# Patient Record
Sex: FEMALE | Race: WHITE | Employment: FULL TIME | URBAN - METROPOLITAN AREA
[De-identification: names, ages, dates, MRNs, and addresses within clinical notes are randomized per-mention and may not be internally consistent; named-entity substitution may affect disease eponyms.]

---

## 2017-08-21 LAB
ABO + RH BLD: NORMAL
ABO + RH BLD: NORMAL
HBV SURFACE AG SERPL QL IA: NORMAL
HIV 1+2 AB+HIV1 P24 AG SERPL QL IA: NORMAL
RUBELLA ANTIBODY IGG QUANTITATIVE: NORMAL IU/ML

## 2018-01-19 ENCOUNTER — OFFICE VISIT (OUTPATIENT)
Dept: FAMILY MEDICINE | Facility: CLINIC | Age: 33
End: 2018-01-19
Payer: COMMERCIAL

## 2018-01-19 VITALS
HEART RATE: 104 BPM | OXYGEN SATURATION: 100 % | DIASTOLIC BLOOD PRESSURE: 57 MMHG | BODY MASS INDEX: 25.95 KG/M2 | TEMPERATURE: 97.9 F | SYSTOLIC BLOOD PRESSURE: 100 MMHG | HEIGHT: 64 IN | WEIGHT: 152 LBS

## 2018-01-19 DIAGNOSIS — B97.89 VIRAL SINUSITIS: Primary | ICD-10-CM

## 2018-01-19 DIAGNOSIS — J32.9 VIRAL SINUSITIS: Primary | ICD-10-CM

## 2018-01-19 DIAGNOSIS — Z3A.29 29 WEEKS GESTATION OF PREGNANCY: ICD-10-CM

## 2018-01-19 PROCEDURE — 99213 OFFICE O/P EST LOW 20 MIN: CPT | Performed by: PREVENTIVE MEDICINE

## 2018-01-19 NOTE — PROGRESS NOTES
SUBJECTIVE:   Zee Mensah is a 32 year old female who presents to clinic today for the following health issues:      RESPIRATORY SYMPTOMS      Duration: X 2 WEEKS    Description  nasal congestion, rhinorrhea, sore throat, facial pain/pressure, cough, fever, chills, ear pain bilateral, headache, fatigue/malaise, hoarse voice and myalgias    Severity: severe    Accompanying signs and symptoms: None    History (predisposing factors):  none    Precipitating or alleviating factors: 29 weeks pregnant    Therapies tried and outcome:  acetaminophen    Daughter with ear infection  Increased fatigue since yesterday  Nasal congestion+  Post nasal drainage+  No rash  No diarrhea  Chills+   Daughter with pneumonia       Problem list and histories reviewed & adjusted, as indicated.  Additional history: as documented    Patient Active Problem List   Diagnosis     CARDIOVASCULAR SCREENING; LDL GOAL LESS THAN 160     Labor and delivery indication for care or intervention      (spontaneous vaginal delivery)      delivery     Past Surgical History:   Procedure Laterality Date     HC TOOTH EXTRACTION W/FORCEP         Social History   Substance Use Topics     Smoking status: Never Smoker     Smokeless tobacco: Never Used     Alcohol use Yes     Family History   Problem Relation Age of Onset     CEREBROVASCULAR DISEASE Maternal Grandfather      Breast Cancer Maternal Grandmother      dx age <40     HEART DISEASE Maternal Grandfather      MI         No current outpatient prescriptions on file.     No Known Allergies  BP Readings from Last 3 Encounters:   18 100/57   16 95/62   07/08/15 97/57    Wt Readings from Last 3 Encounters:   18 152 lb (68.9 kg)   16 129 lb 3.2 oz (58.6 kg)   07/06/15 153 lb (69.4 kg)                  Labs reviewed in EPIC        [facility]  [facility]       Reviewed and updated as needed this visit by Provider      "    ROS:  Constitutional, HEENT, cardiovascular, pulmonary, gi and gu systems are negative, except as otherwise noted.      OBJECTIVE:                                                    /57  Pulse 104  Temp 97.9  F (36.6  C) (Oral)  Ht 5' 4\" (1.626 m)  Wt 152 lb (68.9 kg)  SpO2 100%  Breastfeeding? No  BMI 26.09 kg/m2  Body mass index is 26.09 kg/(m^2).  GENERAL APPEARANCE: healthy, alert and no distress  EYES: Eyes grossly normal to inspection and conjunctivae and sclerae normal  HENT: ear canals and TM's normal, nose and mouth without ulcers or lesions and No pharyngeal exudates or pus points, no uvular deviation, no tenderness over the sinuses   NECK: no adenopathy and trachea midline and normal to palpation  RESP: lungs clear to auscultation - no rales, rhonchi or wheezes  CV: regular rates and rhythm, normal S1 S2, no S3 or S4 and no murmur, click or rub  ABDOMEN: Gravid uterus+  SKIN: no suspicious lesions or rashes  NEURO: Normal strength and tone, mentation intact and speech normal  PSYCH: mentation appears normal and affect normal/bright    Diagnostic test results:  Diagnostic Test Results:  No results found for this or any previous visit (from the past 24 hour(s)).       ASSESSMENT/PLAN:                                                    1. Viral sinusitis  Your symptoms and exam today indicate that you have a viral upper respiratory illness.  This includes viral rhinosinusitis and viral bronchitis.  Antibiotics do not help viral illnesses; the best remedies treat the symptoms (see below).  The typical course of a viral illness is that you feel miserable for the first few days - with sore throat, runny nose/nasal congestion, cough, and sometimes fever and body aches.  You should start to feel better after about 5-7 days and much better by 10-14 days.  If you develop sudden worsening of symptoms or fever after the first 5-7 days, or if you have persistence of your symptoms beyond 14 days, let " us know as you may have developed a secondary bacterial infection.  Get plenty of rest, especially while you have a fever. Stop smoking and avoid secondhand smoke. Drink lots of fluids such as water and clear soups. Fluids help loosen mucus. Fluids are also important because they help prevent dehydration. Gargle with warm salt water a few times a day to relieve a sore throat. Throat sprays or lozenges may also help relieve the pain.Avoid alcohol. Use saline (salt water) nose drops to help loosen mucus and moisten the tender skin in your nose.    -List of medication safe for use in pregnancy discussed, this includes saline sinus rinse, Claritin, Zyrtec, Benadryl, Flonase nasal spray       2. 29 weeks gestation of pregnancy  -Per PCP       Follow up with Provider - If not better in 1 week or fever over 101 F     Criselda Parker MD MPH    [facility]

## 2018-01-19 NOTE — MR AVS SNAPSHOT
After Visit Summary   1/19/2018    Zee Mensah    MRN: [id number]           Patient Information     Date Of Birth          1985        Visit Information        Provider Department      1/19/2018 10:20 AM Criselda Parker MD [facility]        Today's Diagnoses     Viral sinusitis    -  1      Care Instructions    At [facility], we strive to deliver an exceptional experience to you, every time we see you.  If you receive a survey in the mail, please send us back your thoughts. We really do value your feedback.    Based on your medical history, these are the current health maintenance/preventive care services that you are due for (some may have been done at this visit.)  Health Maintenance Due   Topic Date Due     INFLUENZA VACCINE (SYSTEM ASSIGNED)  09/01/2017         Suggested websites for health information:  Www.[employer] : Up to date and easily searchable information on multiple topics.  Www.[place].gov : medication info, interactive tutorials, watch real surgeries online  Www.familydoctor.org : good info from the Academy of Family Physicians  Www.cdc.gov : public health info, travel advisories, epidemics (H1N1)  Www.aap.org : children's health info, normal development, vaccinations  Www.health.state[place].us : [place] dept of health, public health issues in [place], N1N1    Your care team:                            Family Medicine Internal Medicine   MD Wild Saucedo MD Shantel Branch-Fleming, MD Katya Georgiev PA-C Nam Ho, MD Pediatrics   LILIANA Payne, MD Lelo Gillespie CNP, MD Deborah Mielke, MD Kim Thein, APRNEIDA CNP      Clinic hours: Monday - Thursday 7 am-7 pm; Fridays 7 am-5 pm.   Urgent care: Monday - Friday 11 am-9 pm; Saturday and Sunday 9 am-5 pm.  Pharmacy : Monday -Thursday 8 am-8 pm; Friday 8 am-6 pm; Saturday and Sunday 9 am-5 pm.     Clinic: [phone]   Pharmacy: [phone]      Self-Care for Sinusitis     Drinking plenty of water can help sinuses drain.   Sinusitis can often be managed with self-care. Self-care can keep sinuses moist and make you feel more comfortable. Remember to follow your doctor's instructions closely. This can make a big difference in getting your sinus problem under control.  Drink fluids  Drinking extra fluids helps thin your mucus. This lets it drain from your sinuses more easily. Have a glass of water every hour or two. A humidifier helps in much the same way. Fluids can also offset the drying effects of certain medicines. If you use a humidifier, follow the product maker's instructions on how to use it. Clean it on a regular schedule.  Use saltwater rinses  Rinses help keep your sinuses and nose moist. Mix a teaspoon of salt in 8 ounces of fresh, warm water. Use a bulb syringe to gently squirt the water into your nose a few times a day. You can also buy ready-made saline nasal sprays.  Apply hot or cold packs  Applying heat to the area surrounding your sinuses may make you feel more comfortable. Use a hot water bottle or a hand towel dipped in hot water. Some people also find ice packs effective for relieving pain.  Medicines  Your doctor may prescribe medications to help treat your sinusitis. If you have an infection, antibiotics can help clear it up. If you are prescribed antibiotics, take all pills on schedule until they are gone, even if you feel better. Decongestants help relieve swelling. Use decongestant sprays for short periods only under the direction of your doctor. If you have allergies, your doctor may prescribe medications to help relieve them.   Date Last Reviewed: 10/1/2016    1931-1504 [employer]. [address] [place] [address], [place] [address]. All rights reserved. This information is not intended as a substitute for professional medical care. Always follow your healthcare professional's  "instructions.                Follow-ups after your visit        Who to contact     If you have questions or need follow up information about today's clinic visit or your schedule please contact [facility] directly at [phone].  Normal or non-critical lab and imaging results will be communicated to you by MyChart, letter or phone within 4 business days after the clinic has received the results. If you do not hear from us within 7 days, please contact the clinic through MyChart or phone. If you have a critical or abnormal lab result, we will notify you by phone as soon as possible.  Submit refill requests through [employer] or call your pharmacy and they will forward the refill request to us. Please allow 3 business days for your refill to be completed.          Additional Information About Your Visit        [employer] Information     [employer] lets you send messages to your doctor, view your test results, renew your prescriptions, schedule appointments and more. To sign up, go to [email or web address] . Click on \"Log in\" on the left side of the screen, which will take you to the Welcome page. Then click on \"Sign up Now\" on the right side of the page.     You will be asked to enter the access code listed below, as well as some personal information. Please follow the directions to create your username and password.     Your access code is: [email or web address]  Expires: 2018 10:55 AM     Your access code will  in 90 days. If you need help or a new code, please call your [place] clinic or [phone].        Care EveryWhere ID     This is your Care EveryWhere ID. This could be used by other organizations to access your [place] medical records  [id number]        Your Vitals Were     Pulse Temperature Height Pulse Oximetry Breastfeeding? BMI (Body Mass Index)    104 97.9  F (36.6  C) (Oral) 5' 4\" (1.626 m) 100% No 26.09 kg/m2       Blood Pressure from Last 3 Encounters:   18 100/57 "   02/18/16 95/62   07/08/15 97/57    Weight from Last 3 Encounters:   01/19/18 152 lb (68.9 kg)   02/18/16 129 lb 3.2 oz (58.6 kg)   07/06/15 153 lb (69.4 kg)              Today, you had the following     No orders found for display         Today's Medication Changes          These changes are accurate as of: 1/19/18 10:55 AM.  If you have any questions, ask your nurse or doctor.               Stop taking these medicines if you haven't already. Please contact your care team if you have questions.     azithromycin 250 MG tablet   Commonly known as:  ZITHROMAX   Stopped by:  Criselda Parker MD           ibuprofen 400 MG tablet   Commonly known as:  ADVIL/MOTRIN   Stopped by:  Criselda Parker MD                    Primary Care Provider Office Phone # Fax #    Maria E Neyda Sánchez [phone]       [facility] [address] [facility] [address]        Equal Access to Services     [facility]: Hadii aad ku hadasho Soomaali, waaxda luqadaha, qaybta kaalmada adeegyada, waxay idiin hayaan carmen patel . So [facility] [phone].    ATENCIÓN: Si habla español, tiene a weeks disposición servicios gratuitos de asistencia lingüística. Llame al [phone].    We comply with applicable federal civil rights laws and [place] laws. We do not discriminate on the basis of race, color, national origin, age, disability, sex, sexual orientation, or gender identity.            Thank you!     Thank you for choosing [facility]  for your care. Our goal is always to provide you with excellent care. Hearing back from our patients is one way we can continue to improve our services. Please take a few minutes to complete the written survey that you may receive in the mail after your visit with us. Thank you!             Your Updated Medication List - Protect others around you: Learn how to safely use, store and throw away your medicines at www.disposemymeds.org.      Notice  As of 1/19/2018  10:55 AM    You have not been prescribed any medications.

## 2018-01-19 NOTE — PATIENT INSTRUCTIONS
At [facility], we strive to deliver an exceptional experience to you, every time we see you.  If you receive a survey in the mail, please send us back your thoughts. We really do value your feedback.    Based on your medical history, these are the current health maintenance/preventive care services that you are due for (some may have been done at this visit.)  Health Maintenance Due   Topic Date Due     INFLUENZA VACCINE (SYSTEM ASSIGNED)  09/01/2017         Suggested websites for health information:  Www.[employer] : Up to date and easily searchable information on multiple topics.  Www.[employer].gov : medication info, interactive tutorials, watch real surgeries online  Www.familydoctor.org : good info from the Academy of Family Physicians  Www.cdc.gov : public health info, travel advisories, epidemics (H1N1)  Www.aap.org : children's health info, normal development, vaccinations  Www.health.[facility].[place].us : [place] dept of health, public health issues in [place], N1N1    Your care team:                            Family Medicine Internal Medicine   MD Wild Saucedo MD Shantel Branch-Fleming, MD Katya Georgiev PA-C Nam Ho, MD Pediatrics   Pasha Morel PAFARNAZ Moreno, CNP Lesley Valle APRN CNP   MD Lelo Trivedi MD Deborah Mielke, MD Kim Thein, APRN CNP      Clinic hours: Monday - Thursday 7 am-7 pm; Fridays 7 am-5 pm.   Urgent care: Monday - Friday 11 am-9 pm; Saturday and Sunday 9 am-5 pm.  Pharmacy : Monday -Thursday 8 am-8 pm; Friday 8 am-6 pm; Saturday and Sunday 9 am-5 pm.     Clinic: [phone]   Pharmacy: [phone]      Self-Care for Sinusitis     Drinking plenty of water can help sinuses drain.   Sinusitis can often be managed with self-care. Self-care can keep sinuses moist and make you feel more comfortable. Remember to follow your doctor's instructions closely. This can make a big difference in getting your sinus problem under  control.  Drink fluids  Drinking extra fluids helps thin your mucus. This lets it drain from your sinuses more easily. Have a glass of water every hour or two. A humidifier helps in much the same way. Fluids can also offset the drying effects of certain medicines. If you use a humidifier, follow the product maker's instructions on how to use it. Clean it on a regular schedule.  Use saltwater rinses  Rinses help keep your sinuses and nose moist. Mix a teaspoon of salt in 8 ounces of fresh, warm water. Use a bulb syringe to gently squirt the water into your nose a few times a day. You can also buy ready-made saline nasal sprays.  Apply hot or cold packs  Applying heat to the area surrounding your sinuses may make you feel more comfortable. Use a hot water bottle or a hand towel dipped in hot water. Some people also find ice packs effective for relieving pain.  Medicines  Your doctor may prescribe medications to help treat your sinusitis. If you have an infection, antibiotics can help clear it up. If you are prescribed antibiotics, take all pills on schedule until they are gone, even if you feel better. Decongestants help relieve swelling. Use decongestant sprays for short periods only under the direction of your doctor. If you have allergies, your doctor may prescribe medications to help relieve them.   Date Last Reviewed: 10/1/2016    9553-1466 [employer]. [address] [place] [address], [place] [address]. All rights reserved. This information is not intended as a substitute for professional medical care. Always follow your healthcare professional's instructions.

## 2018-01-20 ENCOUNTER — NURSE TRIAGE (OUTPATIENT)
Dept: NURSING | Facility: CLINIC | Age: 33
End: 2018-01-20

## 2018-01-20 NOTE — TELEPHONE ENCOUNTER
"Seen in clinic yesterday, diagnosed with viral sinusitis.  Symptoms began with nasal congestion approx 2 weeks ago, starting Monday 1/15/18 started having pain in sinuses and teeth, sore throat from drainage, fever and green nasal drainage.  Right side of face is \"very tender\" to the touch.  Taking Tylenol and did take Benadryl which slightly helped, able to sleep x 4 hours last night.      Reason for Disposition    [1] Sinus pain (not just congestion) AND [2] fever    Protocols used: SINUS PAIN OR CONGESTION-ADULT-    "

## 2018-03-09 LAB — GROUP B STREP PCR: NORMAL

## 2018-03-26 ENCOUNTER — HOSPITAL ENCOUNTER (INPATIENT)
Facility: CLINIC | Age: 33
LOS: 1 days | Discharge: HOME | End: 2018-03-27
Attending: OBSTETRICS & GYNECOLOGY | Admitting: ADVANCED PRACTICE MIDWIFE
Payer: COMMERCIAL

## 2018-03-26 LAB
ABO + RH BLD: NORMAL
ABO + RH BLD: NORMAL
BLOOD BANK CMNT PATIENT-IMP: NORMAL
SPECIMEN EXP DATE BLD: NORMAL
T PALLIDUM IGG+IGM SER QL: NEGATIVE

## 2018-03-26 PROCEDURE — 86900 BLOOD TYPING SEROLOGIC ABO: CPT | Performed by: OBSTETRICS & GYNECOLOGY

## 2018-03-26 PROCEDURE — 10907ZC DRAINAGE OF AMNIOTIC FLUID, THERAPEUTIC FROM PRODUCTS OF CONCEPTION, VIA NATURAL OR ARTIFICIAL OPENING: ICD-10-PCS | Performed by: ADVANCED PRACTICE MIDWIFE

## 2018-03-26 PROCEDURE — 72200001 ZZH LABOR CARE VAGINAL DELIVERY SINGLE

## 2018-03-26 PROCEDURE — 36415 COLL VENOUS BLD VENIPUNCTURE: CPT | Performed by: OBSTETRICS & GYNECOLOGY

## 2018-03-26 PROCEDURE — 25000132 ZZH RX MED GY IP 250 OP 250 PS 637: Performed by: ADVANCED PRACTICE MIDWIFE

## 2018-03-26 PROCEDURE — 12000037 ZZH R&B POSTPARTUM INTERMEDIATE

## 2018-03-26 PROCEDURE — 59025 FETAL NON-STRESS TEST: CPT

## 2018-03-26 PROCEDURE — 86780 TREPONEMA PALLIDUM: CPT | Performed by: OBSTETRICS & GYNECOLOGY

## 2018-03-26 PROCEDURE — G0463 HOSPITAL OUTPT CLINIC VISIT: HCPCS | Mod: 25

## 2018-03-26 PROCEDURE — 86901 BLOOD TYPING SEROLOGIC RH(D): CPT | Performed by: OBSTETRICS & GYNECOLOGY

## 2018-03-26 PROCEDURE — 25000128 H RX IP 250 OP 636: Performed by: OBSTETRICS & GYNECOLOGY

## 2018-03-26 RX ORDER — ACETAMINOPHEN 325 MG/1
650 TABLET ORAL EVERY 4 HOURS PRN
Status: DISCONTINUED | OUTPATIENT
Start: 2018-03-26 | End: 2018-03-26

## 2018-03-26 RX ORDER — MISOPROSTOL 200 UG/1
400 TABLET ORAL
Status: DISCONTINUED | OUTPATIENT
Start: 2018-03-26 | End: 2018-03-28 | Stop reason: HOSPADM

## 2018-03-26 RX ORDER — AMOXICILLIN 250 MG
1 CAPSULE ORAL 2 TIMES DAILY PRN
Status: DISCONTINUED | OUTPATIENT
Start: 2018-03-26 | End: 2018-03-28 | Stop reason: HOSPADM

## 2018-03-26 RX ORDER — IBUPROFEN 400 MG/1
800 TABLET, FILM COATED ORAL
Status: DISCONTINUED | OUTPATIENT
Start: 2018-03-26 | End: 2018-03-26

## 2018-03-26 RX ORDER — OXYTOCIN 10 [USP'U]/ML
10 INJECTION, SOLUTION INTRAMUSCULAR; INTRAVENOUS
Status: COMPLETED | OUTPATIENT
Start: 2018-03-26 | End: 2018-03-26

## 2018-03-26 RX ORDER — OXYTOCIN/0.9 % SODIUM CHLORIDE 30/500 ML
340 PLASTIC BAG, INJECTION (ML) INTRAVENOUS CONTINUOUS PRN
Status: DISCONTINUED | OUTPATIENT
Start: 2018-03-26 | End: 2018-03-28 | Stop reason: HOSPADM

## 2018-03-26 RX ORDER — PRENATAL VIT/IRON FUM/FOLIC AC 27MG-0.8MG
1 TABLET ORAL DAILY
COMMUNITY

## 2018-03-26 RX ORDER — AMOXICILLIN 250 MG
2 CAPSULE ORAL 2 TIMES DAILY PRN
Status: DISCONTINUED | OUTPATIENT
Start: 2018-03-26 | End: 2018-03-28 | Stop reason: HOSPADM

## 2018-03-26 RX ORDER — ONDANSETRON 2 MG/ML
4 INJECTION INTRAMUSCULAR; INTRAVENOUS EVERY 6 HOURS PRN
Status: DISCONTINUED | OUTPATIENT
Start: 2018-03-26 | End: 2018-03-26

## 2018-03-26 RX ORDER — NALOXONE HYDROCHLORIDE 0.4 MG/ML
.1-.4 INJECTION, SOLUTION INTRAMUSCULAR; INTRAVENOUS; SUBCUTANEOUS
Status: DISCONTINUED | OUTPATIENT
Start: 2018-03-26 | End: 2018-03-26

## 2018-03-26 RX ORDER — METHYLERGONOVINE MALEATE 0.2 MG/ML
200 INJECTION INTRAVENOUS
Status: DISCONTINUED | OUTPATIENT
Start: 2018-03-26 | End: 2018-03-26

## 2018-03-26 RX ORDER — FENTANYL CITRATE 50 UG/ML
50-100 INJECTION, SOLUTION INTRAMUSCULAR; INTRAVENOUS
Status: DISCONTINUED | OUTPATIENT
Start: 2018-03-26 | End: 2018-03-26

## 2018-03-26 RX ORDER — OXYCODONE AND ACETAMINOPHEN 5; 325 MG/1; MG/1
1 TABLET ORAL
Status: DISCONTINUED | OUTPATIENT
Start: 2018-03-26 | End: 2018-03-26

## 2018-03-26 RX ORDER — SODIUM CHLORIDE, SODIUM LACTATE, POTASSIUM CHLORIDE, CALCIUM CHLORIDE 600; 310; 30; 20 MG/100ML; MG/100ML; MG/100ML; MG/100ML
INJECTION, SOLUTION INTRAVENOUS CONTINUOUS
Status: DISCONTINUED | OUTPATIENT
Start: 2018-03-26 | End: 2018-03-26

## 2018-03-26 RX ORDER — HYDROCORTISONE 2.5 %
CREAM (GRAM) TOPICAL 3 TIMES DAILY PRN
Status: DISCONTINUED | OUTPATIENT
Start: 2018-03-26 | End: 2018-03-28 | Stop reason: HOSPADM

## 2018-03-26 RX ORDER — OXYTOCIN/0.9 % SODIUM CHLORIDE 30/500 ML
100 PLASTIC BAG, INJECTION (ML) INTRAVENOUS CONTINUOUS
Status: DISCONTINUED | OUTPATIENT
Start: 2018-03-26 | End: 2018-03-28 | Stop reason: HOSPADM

## 2018-03-26 RX ORDER — BISACODYL 10 MG
10 SUPPOSITORY, RECTAL RECTAL DAILY PRN
Status: DISCONTINUED | OUTPATIENT
Start: 2018-03-28 | End: 2018-03-28 | Stop reason: HOSPADM

## 2018-03-26 RX ORDER — IBUPROFEN 400 MG/1
800 TABLET, FILM COATED ORAL EVERY 6 HOURS PRN
Status: DISCONTINUED | OUTPATIENT
Start: 2018-03-26 | End: 2018-03-28 | Stop reason: HOSPADM

## 2018-03-26 RX ORDER — OXYTOCIN/0.9 % SODIUM CHLORIDE 30/500 ML
100-340 PLASTIC BAG, INJECTION (ML) INTRAVENOUS CONTINUOUS PRN
Status: DISCONTINUED | OUTPATIENT
Start: 2018-03-26 | End: 2018-03-26

## 2018-03-26 RX ORDER — CARBOPROST TROMETHAMINE 250 UG/ML
250 INJECTION, SOLUTION INTRAMUSCULAR
Status: DISCONTINUED | OUTPATIENT
Start: 2018-03-26 | End: 2018-03-26

## 2018-03-26 RX ORDER — LANOLIN 100 %
OINTMENT (GRAM) TOPICAL
Status: DISCONTINUED | OUTPATIENT
Start: 2018-03-26 | End: 2018-03-28 | Stop reason: HOSPADM

## 2018-03-26 RX ORDER — OXYTOCIN 10 [USP'U]/ML
10 INJECTION, SOLUTION INTRAMUSCULAR; INTRAVENOUS
Status: DISCONTINUED | OUTPATIENT
Start: 2018-03-26 | End: 2018-03-28 | Stop reason: HOSPADM

## 2018-03-26 RX ORDER — ACETAMINOPHEN 325 MG/1
650 TABLET ORAL EVERY 4 HOURS PRN
Status: DISCONTINUED | OUTPATIENT
Start: 2018-03-26 | End: 2018-03-28 | Stop reason: HOSPADM

## 2018-03-26 RX ADMIN — IBUPROFEN 800 MG: 400 TABLET ORAL at 17:47

## 2018-03-26 RX ADMIN — ACETAMINOPHEN 650 MG: 325 TABLET, FILM COATED ORAL at 23:41

## 2018-03-26 RX ADMIN — OXYTOCIN 10 UNITS: 10 INJECTION INTRAVENOUS at 13:29

## 2018-03-26 RX ADMIN — IBUPROFEN 800 MG: 400 TABLET ORAL at 23:41

## 2018-03-26 NOTE — PLAN OF CARE
"Ashley Carol CNM called with update. Updated regarding, but not limited to, latest SVE, patient stating she is more uncomfortable with contractions. Patient stated \"when I started to feel this way with my last baby, I had her 15 minutes later\". Patient declined pain interventions at this time. Plan for Ashley to work her way towards the hospital. Will call her if needed sooner.   "

## 2018-03-26 NOTE — PLAN OF CARE
Problem: Patient Care Overview  Goal: Plan of Care/Patient Progress Review  Outcome: Improving  Pt arrived to [facility] around 1600.  Oriented to room, call light, and safety education.  Pt has been able to void in large amounts and bleeding is scant.  Ambulating without difficulty and tolerating regular diet and PO pain meds.  Taking ibuprofen for cramping while breastfeeding.  Independent with feedings and baby cares.  Pt requests to have bath done later this evening as there is a lot of family and visitors present.  Encouraged pt to call with questions or needs. Pt states she would like to DC home 3/27 if possible.  Cont to monitor.

## 2018-03-26 NOTE — IP AVS SNAPSHOT
[address]    [place] [address]    Phone:  [phone]                                       After Visit Summary   3/26/2018    Zee Mensah    MRN: [id number]           After Visit Summary Signature Page     I have received my discharge instructions, and my questions have been answered. I have discussed any challenges I see with this plan with the nurse or doctor.    ..........................................................................................................................................  Patient/Patient Representative Signature      ..........................................................................................................................................  Patient Representative Print Name and Relationship to Patient    ..................................................               ................................................  Date                                            Time    ..........................................................................................................................................  Reviewed by Signature/Title    ...................................................              ..............................................  Date                                                            Time

## 2018-03-26 NOTE — PLAN OF CARE
Took over care from No MCMANUS RN at 1310. Ashley APPIAH CNM in room. Pt with urge to push. Josefa SIDDIQI CNM called to attend delivery. Infant starting to crown @ 1320. Another OB provider brought in to room for assistance. Approximately <1 minute shoulder dystocia. Delivery of viable baby boy at 1322. Infant has bruised head and face. No FHT tracing- had reassuring intermittent auscultation in labor.

## 2018-03-26 NOTE — IP AVS SNAPSHOT
MRN:[id number]                      After Visit Summary   3/26/2018    Zee Mensah    MRN: [id number]           Thank you!     Thank you for choosing [facility] for your care. Our goal is always to provide you with excellent care. Hearing back from our patients is one way we can continue to improve our services. Please take a few minutes to complete the written survey that you may receive in the mail after you visit with us. Thank you!        Patient Information     Date Of Birth          1985        About your hospital stay     You were admitted on:  March 26, 2018 You last received care in the:  [address]    You were discharged on:  March 27, 2018        Reason for your hospital stay       Maternity care                  Who to Call     For medical emergencies, please call 911.  For non-urgent questions about your medical care, please call your primary care provider or clinic, [phone]          Attending Provider     Provider Specialty    Fátima Beavers MD OB/Gyn    Josefa Andrade APRN CNM Midwives       Primary Care Provider Office Phone # Fax #    Maria E Sánchez [phone]      After Care Instructions     Activity       Review discharge instructions            Diet       Resume previous diet            Discharge Instructions - Postpartum visit       Schedule postpartum visit with your provider and return to clinic in 6 weeks.                  Follow-up Appointments     Follow Up and recommended labs and tests                 Further instructions from your care team       Postpartum Vaginal Delivery Instructions    Activity       Ask family and friends for help when you need it.    Do not place anything in your vagina for 6 weeks.    You are not restricted on other activities, but take it easy for a few weeks to allow your body to recover from delivery.  You are able to do any activities you feel  up to that point.    No driving until you have stopped taking your pain medications (usually two weeks after delivery).     Call your health care provider if you have any of these symptoms:       Increased pain, swelling, redness, or fluid around your stiches from an episiotomy or perineal tear.    A fever above 100.4 F (38 C) with or without chills when placing a thermometer under your tongue.    You soak a sanitary pad with blood within 1 hour, or you see blood clots larger than a golf ball.    Bleeding that lasts more than 6 weeks.    Vaginal discharge that smells bad.    Severe pain, cramping or tenderness in your lower belly area.    A need to urinate more frequently (use the toilet more often), more urgently (use the toilet very quickly), or it burns when you urinate.    Nausea and vomiting.    Redness, swelling or pain around a vein in your leg.    Problems breastfeeding or a red or painful area on your breast.    Chest pain and cough or are gasping for air.    Problems coping with sadness, anxiety, or depression.  If you have any concerns about hurting yourself or the baby, call your provider immediately.     You have questions or concerns after you return home.     Keep your hands clean:  Always wash your hands before touching your perineal area and stitches.  This helps reduce your risk of infection.  If your hands aren't dirty, you may use an alcohol hand-rub to clean your hands. Keep your nails clean and short.        Pending Results     Date and Time Order Name Status Description    3/27/2018 0600 Rh Immune Globulin Study In process             Statement of Approval     Ordered          03/27/18 0858  I have reviewed and agree with all the recommendations and orders detailed in this document.  EFFECTIVE NOW     Approved and electronically signed by:  Rach Rawls CNM             Admission Information     Date & Time Provider Department Dept. Phone    3/26/2018 Josefa Andrade APRN CNM  "[address] [phone]      Your Vitals Were     Blood Pressure Pulse Temperature Respirations Last Period       109/61 (BP Location: Right arm) 82 97.8  F (36.6  C) (Oral) 16 2017       [employer] Information     [facility] lets you send messages to your doctor, view your test results, renew your prescriptions, schedule appointments and more. To sign up, go to [email or web address] . Click on \"Log in\" on the left side of the screen, which will take you to the Welcome page. Then click on \"Sign up Now\" on the right side of the page.     You will be asked to enter the access code listed below, as well as some personal information. Please follow the directions to create your username and password.     Your access code is: [email or web address]  Expires: 2018 11:55 AM     Your access code will  in 90 days. If you need help or a new code, please call your [facility] clinic or [phone].        Care EveryWhere ID     This is your Care EveryWhere ID. This could be used by other organizations to access your [facility] medical records  [id number]        Equal Access to Services     ROXANNE GONZALEZ AH: Eder Moreno, wadiego manning, qaybta kaalmada adejose, anabela schwartz. So [facility] [phone].    ATENCIÓN: Si habla español, tiene a weeks disposición servicios gratuitos de asistencia lingüística. Llame al [phone].    We comply with applicable federal civil rights laws and [place] laws. We do not discriminate on the basis of race, color, national origin, age, disability, sex, sexual orientation, or gender identity.               Review of your medicines      CONTINUE these medicines which have NOT CHANGED        Dose / Directions    prenatal multivitamin plus iron 27-0.8 MG Tabs per tablet        Dose:  1 tablet   Take 1 tablet by mouth daily   Refills:  0                Protect others around you: Learn how to safely use, store and throw away your " medicines at www.disposemymeds.org.             Medication List: This is a list of all your medications and when to take them. Check marks below indicate your daily home schedule. Keep this list as a reference.      Medications           Morning Afternoon Evening Bedtime As Needed    prenatal multivitamin plus iron 27-0.8 MG Tabs per tablet   Take 1 tablet by mouth daily

## 2018-03-26 NOTE — PROGRESS NOTES
[facility] Labor and Delivery History and Physical    Zee Cardenas MRN# [id number]   Age: 32 year old YOB: 1985     Date of Admission:  3/26/2018  Time of Admission: Approximately 05:00am    No changes in patient status or health history since last clinic visit. Please see comprehensive history in patient's prenatal record.         Chief Complaint:   Zee Cardenas is a 32 year old female who is 39w1d pregnant and being admitted for spontaneous onset of contractions. She reports mild ctx, that feel like tightening, started at approximately 01:40am on 3/26/18. Positive fetal movement. Denies vaginal bleeding, leaking of fluid, severe headache, RUQ pain, or visual changes. She has history of unplanned delivery at home with second child due to rapid delivery, and history of 36w5d with third child. Presented to triage for evaluation at approximately 03:30am.    Rh negative, received Rhogam at 28w5d  GBS negative  Rubella immune          Pregnancy history:     OBSTETRIC HISTORY:    Obstetric History       T1      L3     SAB0   TAB0   Ectopic0   Multiple0   Live Births3       # Outcome Date GA Lbr Master/2nd Weight Sex Delivery Anes PTL Lv   4 Current            3  07/06/15 36w5d 07:04 / 00:02 3.056 kg (6 lb 11.8 oz) F Vag-Spont None  UNIQUE      Apgar1:  8                Apgar5: 8   2 Para 12   3.118 kg (6 lb 14 oz) F Vag-Spont None N UNIQUE      Name: VANESSA CARDENAS Term 06/03/10   3.657 kg (8 lb 1 oz) M  EPI N UNIQUE          EDC: Estimated Date of Delivery: 2018    Prenatal Labs:   Lab Results   Component Value Date    ABO O 2018    RH Neg 2018    HEPBANG neg 2017    TREPAB non reactive 2011    RUBELLAABIGG immune 2011    HGB 13.6 2016       GBS Status:   Lab Results   Component Value Date    GBS neg 2018       Active Problem List  Patient Active Problem List   Diagnosis     CARDIOVASCULAR SCREENING; LDL  GOAL LESS THAN 160     Labor and delivery indication for care or intervention      (spontaneous vaginal delivery)      delivery     Indication for care in labor or delivery     Normal labor and delivery       Medication Prior to Admission  Prescriptions Prior to Admission   Medication Sig Dispense Refill Last Dose     Prenatal Vit-Fe Fumarate-FA (PRENATAL MULTIVITAMIN PLUS IRON) 27-0.8 MG TABS per tablet Take 1 tablet by mouth daily   3/25/2018 at Unknown time   .        Maternal Past Medical History:   History reviewed. No pertinent past medical history.                       Social History:   I have reviewed this patient's social history          Physical Exam:   Vitals were reviewed  Blood pressure 104/56, pulse 70, temperature 98  F (36.7  C), temperature source Oral, resp. rate 18, last menstrual period 2017, not currently breastfeeding.  Constitutional:   awake, alert, cooperative, no apparent distress, and appears stated age     Abdomen: EFW 7.5#, fetal back on maternal right - by Leopold's     Cervix: Exam performed by RN at approx 03:30am   Membranes: intact   Dilation: 3   Effacement: 80%   Station:-2   Consistency: soft   Position: Anterior  Presentation:Cephalic  Fetal Heart Rate Tracing: reactive and reassuring  Tocometer: external monitor, ctx every 3-6 mins    Additional              Assessment:   Zee Mensah is a 39w1d pregnant female admitted in spontaneous labor.  Rh Neg  Hx of Precipitous labor & delivery          Plan:   Admit - see IP orders  Anticipate     Ashley Medina, ISABELA SPARROW

## 2018-03-26 NOTE — PROGRESS NOTES
"OB Progress Note  3/26/2018  9:52 AM    Patient seen at 8:15am - Note from this time    S:  Pt describes contractions as \"tightening,\" no change from this morning, not painful. Same sensation as she had prior to last precipitous delivery at home. Reported her last labor went quickly after water broke.       O:  /55  Temp 98  F (36.7  C) (Oral)  Resp 18  LMP 2017  EFM: baseline 125bpm, accelerations present, no decelerations, moderate variability; Category 1  [place]:  Ctx q3-6 min  SVE:  4-5/80%/-1  Membranes: AROM at 08:20am for clear fluid    A:  32 year old  @39w1d admitted with spontaneous onset of contractions  History of unplanned delivery at home, patient did not perceive strong contractions  FHR: Category 1  Early Labor and progressing  ROM x 15 mins, GBS negative    P:  1.  Routine cares  2.  Intermittent Auscultation  3. Patient declines saline lock at this time. Discussed risk of delayed care in the case of emergency and the need to place IV if desires epidural or pitocin is needed.  4. Plan unmedicated labor, patient open to other options if needed.  5. Since patient has continued to make cervical change, recommended patient stay at hospital vs.patient going home d/t  ctx not intensifying. Patient agrees to plan. Offered AROM at this time to augment labor, reviewed risks, patient agrees to plan. AROM for clear fluid.    Ashley Medina CNM    "

## 2018-03-26 NOTE — L&D DELIVERY NOTE
Delivery Summary    Zee Mensah MRN# [id number]   Age: 32 year old YOB: 1985       OB Delivery Summary    Zee Mensah is a 31 yo, now , who presented to L&D on 3/26/18, at 39w1d with c/o spontaneous onset of contractions. Her history is significant for: Rh negative status, history of prior unplanned delivery of second child at home following rapid delivery, and  delivery of third baby in hospital at 36w5d.. She was 3/80%/-2 at 03:30am in triage and 3.5cm/80%/-1 at 04:30. Decision was made to admit patient. She felt mild tightening during contractions in morning, and was 4-5cm/80%/-1 at 08:30am. AROM for clear fluid at that time. She progressed to complete dilation at 13:20, during which time she began to feel cramping with contractions. During this time, FHR was Category 1 by EFM then reassuring by intermittent auscultation. Patient did not receive epidural or other medication for pain management.    Patient began feeling painful contractions around 13:00, and first felt rectal pressure at approximately 13:20. I lifted sheet and fetal head was visible. RN was not present in room at that time. Called RN into room and asked for hospital doctor to be present since I am orienting to hospital and [facility] ObGy physician in OR performing  on other patient. Dr. Alana Christina entered room to provide backup if needed. Patient in semi-spivey's position. Fetal head delivered at 13:22pm, OA and restituting to MAGGIE. Anterior shoulder (left) did not deliver with gentle traction downwards during maternal expulsive efforts. Patient placed in Feliz position and shoulder did not deliver with continued maternal effort. Patient instructed to stop pushing and Dr. Christina applied suprapubic pressure. Anterior shoulder delivered with body quickly following. Nuchal cord noted. Male infant, 8#7.8oz. Initial cry but not initially vigorous so cord clamped and cut. Infant quickly began  "crying and was kept on maternal abdomen, Apgars 8/9. Cord blood collected.    Spontaneous delivery of placenta approximately 5 minutes after infant delivery, intact with 3VC. Inspection revealed intact perineum. Fundus firm. Total EBL 150mL. Mother and baby doing well.      Ashley Medina  3/26/2018  2:05 PM         Labor Event Times    Labor onset date:  3/26/18 Onset time:   1:40 AM            Labor Events     labor?:  No    steroids:  None   Labor Type:  Spontaneous, AROM      Antibiotics received during labor?:  No      Rupture identifier:  Rupture 1   Rupture date/time: 3/26/18 0831   Rupture type:  Artificial Rupture of Membranes   Fluid color:  Clear      Delivery/Placenta Date and Time    Delivery Date:  3/26/18 Delivery Time:   1:22 PM      Apgars    Living status:  Living    1 Minute 5 Minute 10 Minute 15 Minute 20 Minute   Skin color: 0  1       Heart rate: 2  2       Reflex irritability: 2  2       Muscle tone: 2  2       Respiratory effort: 2  2       Total: 8  9          Apgars assigned by:  MAURY PATRICIA RN      Cord    Vessels:  3 Vessels Complications:  Nuchal   Cord Blood Disposition:  Lab Gases Sent?:  No         Rampart Resuscitation       Rampart Care at Delivery:  Bulb suction.       Rampart Measurements    Weight:  8 lb 7.8 oz Length:  1' 9\"   Head circumference:  35.6 cm       Skin to Skin and Feeding Plan    Skin to skin initiation date/time: 3/26/18 1322   Skin to skin with:  Mother   Skin to skin end date/time:     How do you plan to feed your baby:  Breastfeeding      Labor Events and Shoulder Dystocia    Fetal Tracing Prior to Delivery:  Category 1   Fetal Tracing Comments:  Category 1, then reassuring by intermittent auscultation    Shoulder dystocia present?:  Pos   Anterior shoulder:  left Time body delivered:  1322   Time head delivered:  1322    Time recognized:  3/26/2018 1322    Gentle attempt at traction, assisted by maternal expulsive forces?:  Yes             First " Maneuver:  Feliz maneuver, Suprapubic pressure    Time performed:  3/26/2018 1322    Performed by:  Ashley Medina CNM          Delivery (Maternal) (Provider to Complete) ([id number])    Episiotomy:  None   Perineal lacerations:  None    Vaginal laceration?:  No    Cervical laceration?:  No    Est. blood loss (mL):  150         Mother's Information  Mother: Zee Mensah #[id number]    Start of Mother's Information     IO Blood Loss  03/26/18 0140 - 03/26/18 1404    Mom's I/O Activity            End of Mother's Information  Mother: Zee Mensahlena #[id number]            Delivery - Provider to Complete ([id number])    Delivering clinician:  ASHLEY MEDINA CNM Care:  Exclusive CNM care in labor   Attempted Delivery Types (Choose all that apply):  Spontaneous Vaginal Delivery   Delivery Type (Choose the 1 that will go to the Birth History):  Vaginal, Spontaneous Delivery                     Other personnel:   Provider Role   JUSTICE GALLOWAY             Placenta    Immediate Cord Clamping:  Done   Removal:  Spontaneous   Disposition:  Hospital disposal      Presentation and Position    Presentation:  Vertex   Position:  Right Occiput Anterior                    ISABELA Kc CNM

## 2018-03-26 NOTE — PROGRESS NOTES
, 39+1 weeks gestation.  Patient here for rule out labor.  SVE 3/80/-2.  VSS  Denies bleeding and leaking fluid. Positive fetal movement per patient.  0325  Patient up to walk. 0430  SVE 3-/-1.  0437 Dr. Beavers paged, no response.  0450 Dr Beavers paged again. 0453  Dr. Beavers called back and was informed and updated on patient status, reactive NST, contractions, and SVE.  Order to admit patient, admit orders received.  Plan of care reviewed with patient and spouse, understanding verbalized. 0500 Patient to room 219 report given to Macarena Brothers RN.

## 2018-03-27 VITALS
TEMPERATURE: 97.8 F | DIASTOLIC BLOOD PRESSURE: 61 MMHG | SYSTOLIC BLOOD PRESSURE: 109 MMHG | RESPIRATION RATE: 16 BRPM | HEART RATE: 82 BPM

## 2018-03-27 LAB
ABO + RH BLD: NORMAL
ABO + RH BLD: NORMAL
BLOOD BANK CMNT PATIENT-IMP: NORMAL
DATE RH IMM GL GVN: NORMAL
FETAL CELL SCN BLD QL ROSETTE: NORMAL
RH IG VIALS RECOM PATIENT: NORMAL

## 2018-03-27 PROCEDURE — 25000132 ZZH RX MED GY IP 250 OP 250 PS 637: Performed by: ADVANCED PRACTICE MIDWIFE

## 2018-03-27 PROCEDURE — 86901 BLOOD TYPING SEROLOGIC RH(D): CPT | Performed by: REGISTERED NURSE

## 2018-03-27 PROCEDURE — 85461 HEMOGLOBIN FETAL: CPT | Performed by: REGISTERED NURSE

## 2018-03-27 PROCEDURE — 25000128 H RX IP 250 OP 636: Performed by: REGISTERED NURSE

## 2018-03-27 PROCEDURE — 36415 COLL VENOUS BLD VENIPUNCTURE: CPT | Performed by: REGISTERED NURSE

## 2018-03-27 PROCEDURE — 86900 BLOOD TYPING SEROLOGIC ABO: CPT | Performed by: REGISTERED NURSE

## 2018-03-27 RX ADMIN — SENNOSIDES AND DOCUSATE SODIUM 1 TABLET: 8.6; 5 TABLET ORAL at 09:15

## 2018-03-27 RX ADMIN — ACETAMINOPHEN 650 MG: 325 TABLET, FILM COATED ORAL at 09:15

## 2018-03-27 RX ADMIN — HUMAN RHO(D) IMMUNE GLOBULIN 300 MCG: 300 INJECTION, SOLUTION INTRAMUSCULAR at 13:41

## 2018-03-27 RX ADMIN — IBUPROFEN 800 MG: 400 TABLET ORAL at 09:15

## 2018-03-27 NOTE — PLAN OF CARE
D: VSS, assessments WDL.   I: Pt. received complete discharge paperwork and home medications as filled by discharge pharmacy.  Pt. was given times of last dose for all discharge medications in writing on discharge medication sheets.  Discharge teaching included home medication, pain management, activity restrictions, postpartum cares, and signs and symptoms of infection.    A: Discharge outcomes on care plan met.  Mother states understanding and comfort with self and baby cares.  P: Pt. discharged to home.  Pt. was discharged with baby, and bands were checked at time of discharge.  Pt. was accompanied by , nurse and baby, and left with personal belongings.    Pt. to follow up with OB per MD order.  Pt. had no further questions at the time of discharge and no unmet needs were identified.

## 2018-03-27 NOTE — PROGRESS NOTES
OB Post-partum Note  PPD# 1    S:  Patient doing well.  Pain controlled.  Voiding.  Bleeding scant.  Breast feeding.  Doing well, would like to go home, has lots of family support    O:  /65  Pulse 82  Temp 98.1  F (36.7  C) (Oral)  Resp 16  LMP 2017  Breastfeeding  Gen- A&O, NAD  Abd- Non-tender, fundus firm at umbilicus  Ext- non-tender, neg edema    Hemoglobin   Date Value Ref Range Status   2016 13.6 11.7 - 15.7 g/dL Final     O-  Rubella Immune    A/P: 32 year old  PPD# 1 s/p     1. Post partum teaching/precautions reviewed.  2.  RTC in 6 weeks/prn    Rach Rawls CNM  3/27/2018  8:54 AM

## 2018-03-27 NOTE — PLAN OF CARE
Problem: Postpartum (Vaginal Delivery) (Adult,Obstetrics,Pediatric)  Goal: Signs and Symptoms of Listed Potential Problems Will be Absent, Minimized or Managed (Postpartum)  Signs and symptoms of listed potential problems will be absent, minimized or managed by discharge/transition of care (reference Postpartum (Vaginal Delivery) (Adult,Obstetrics,Pediatric) CPG).   Outcome: No Change  VSS. Pain controlled with tylenol and ibuprofen. Breastfeeding well. Encouraged to call for latch checks, questions and concerns. Will continue to monitor.

## 2018-03-27 NOTE — LACTATION NOTE
Initial visit.   Breastfeeding general information reviewed and Baby able to latch on well to the left breast at time of visit.  Lips flanged.  Mother has Shield she brought in from home. She used one with her 2 year old and did not need it after 2 weeks  for 12 months.  Advised to breastfeed exclusively, on demand, avoid pacifiers, bottles and formula unless medically indicated.  Encouraged rooming in, skin to skin, feeding on demand 8-12x/day or sooner if baby cues.  Explained benefits of holding and skin to skin.  Encouraged lots of skin to skin. No further questions at this time.   Continues to nurse well per mom.   Will follow as needed.   Amira Chris RNC, IBCLC

## 2018-11-12 ENCOUNTER — HEALTH MAINTENANCE LETTER (OUTPATIENT)
Age: 33
End: 2018-11-12

## 2023-01-27 ENCOUNTER — LAB REQUISITION (OUTPATIENT)
Dept: LAB | Facility: CLINIC | Age: 38
End: 2023-01-27

## 2023-01-27 DIAGNOSIS — Z13.9 ENCOUNTER FOR SCREENING, UNSPECIFIED: ICD-10-CM

## 2023-01-27 LAB
FASTING STATUS PATIENT QL REPORTED: NO
GLUCOSE SERPL-MCNC: 118 MG/DL (ref 70–99)
TSH SERPL DL<=0.005 MIU/L-ACNC: 1.13 UIU/ML (ref 0.3–4.2)

## 2023-01-27 PROCEDURE — 82947 ASSAY GLUCOSE BLOOD QUANT: CPT | Performed by: MIDWIFE

## 2023-01-27 PROCEDURE — 84443 ASSAY THYROID STIM HORMONE: CPT | Performed by: MIDWIFE

## 2024-08-07 ENCOUNTER — LAB REQUISITION (OUTPATIENT)
Dept: LAB | Facility: CLINIC | Age: 39
End: 2024-08-07
Payer: COMMERCIAL

## 2024-08-07 DIAGNOSIS — Z13.29 ENCOUNTER FOR SCREENING FOR OTHER SUSPECTED ENDOCRINE DISORDER: ICD-10-CM

## 2024-08-07 DIAGNOSIS — Z13.220 ENCOUNTER FOR SCREENING FOR LIPOID DISORDERS: ICD-10-CM

## 2024-08-07 DIAGNOSIS — Z13.1 ENCOUNTER FOR SCREENING FOR DIABETES MELLITUS: ICD-10-CM

## 2024-08-07 DIAGNOSIS — Z12.4 ENCOUNTER FOR SCREENING FOR MALIGNANT NEOPLASM OF CERVIX: ICD-10-CM

## 2024-08-07 LAB
CHOLEST SERPL-MCNC: 242 MG/DL
FASTING STATUS PATIENT QL REPORTED: YES
FASTING STATUS PATIENT QL REPORTED: YES
GLUCOSE SERPL-MCNC: 82 MG/DL (ref 70–99)
HBA1C MFR BLD: 5.3 %
HDLC SERPL-MCNC: 86 MG/DL
LDLC SERPL CALC-MCNC: 134 MG/DL
NONHDLC SERPL-MCNC: 156 MG/DL
TRIGL SERPL-MCNC: 112 MG/DL
TSH SERPL DL<=0.005 MIU/L-ACNC: 1.48 UIU/ML (ref 0.3–4.2)

## 2024-08-07 PROCEDURE — 80061 LIPID PANEL: CPT | Mod: ORL | Performed by: MIDWIFE

## 2024-08-07 PROCEDURE — G0145 SCR C/V CYTO,THINLAYER,RESCR: HCPCS | Mod: ORL | Performed by: MIDWIFE

## 2024-08-07 PROCEDURE — 87624 HPV HI-RISK TYP POOLED RSLT: CPT | Mod: ORL | Performed by: MIDWIFE

## 2024-08-07 PROCEDURE — 84443 ASSAY THYROID STIM HORMONE: CPT | Mod: ORL | Performed by: MIDWIFE

## 2024-08-07 PROCEDURE — 83036 HEMOGLOBIN GLYCOSYLATED A1C: CPT | Mod: ORL | Performed by: MIDWIFE

## 2024-08-07 PROCEDURE — 82947 ASSAY GLUCOSE BLOOD QUANT: CPT | Mod: ORL | Performed by: MIDWIFE

## 2024-08-13 LAB
BKR LAB AP GYN ADEQUACY: NORMAL
BKR LAB AP GYN INTERPRETATION: NORMAL
BKR LAB AP LMP: NORMAL
BKR LAB AP PREVIOUS ABNL DX: NORMAL
BKR LAB AP PREVIOUS ABNORMAL: NORMAL
PATH REPORT.COMMENTS IMP SPEC: NORMAL
PATH REPORT.COMMENTS IMP SPEC: NORMAL
PATH REPORT.RELEVANT HX SPEC: NORMAL

## 2024-08-15 LAB
HPV HR 12 DNA CVX QL NAA+PROBE: NEGATIVE
HPV16 DNA CVX QL NAA+PROBE: NEGATIVE
HPV18 DNA CVX QL NAA+PROBE: NEGATIVE
HUMAN PAPILLOMA VIRUS FINAL DIAGNOSIS: NORMAL